# Patient Record
Sex: FEMALE | URBAN - NONMETROPOLITAN AREA
[De-identification: names, ages, dates, MRNs, and addresses within clinical notes are randomized per-mention and may not be internally consistent; named-entity substitution may affect disease eponyms.]

---

## 2022-06-13 ENCOUNTER — OFFICE VISIT (OUTPATIENT)
Dept: URBAN - NONMETROPOLITAN AREA CLINIC 18 | Facility: CLINIC | Age: 82
End: 2022-06-13
Payer: MEDICARE

## 2022-06-13 DIAGNOSIS — Z96.1 PRESENCE OF PSEUDOPHAKIA: ICD-10-CM

## 2022-06-13 DIAGNOSIS — H35.3113 NONEXUDATIVE MACULAR DEGENERATION, ADVANCED ATROPHIC WITHOUT SUBFOVEAL INVOLVEMENT, RIGHT EYE: ICD-10-CM

## 2022-06-13 DIAGNOSIS — H35.3223 EXUDATIVE MACULAR DEGENERATION, INACTIVE SCAR, LEFT EYE: ICD-10-CM

## 2022-06-13 DIAGNOSIS — H43.813 VITREOUS DEGENERATION, BILATERAL: ICD-10-CM

## 2022-06-13 DIAGNOSIS — H52.4 PRESBYOPIA: ICD-10-CM

## 2022-06-13 DIAGNOSIS — E11.9 TYPE 2 DIABETES MELLITUS W/O COMPLICATION: Primary | ICD-10-CM

## 2022-06-13 PROCEDURE — 99204 OFFICE O/P NEW MOD 45 MIN: CPT | Performed by: OPTOMETRIST

## 2022-06-13 ASSESSMENT — VISUAL ACUITY
OD: 20/150
OS: CF 1FT

## 2022-06-13 ASSESSMENT — INTRAOCULAR PRESSURE
OD: 12
OS: 12

## 2022-06-13 NOTE — IMPRESSION/PLAN
Impression: Type 2 diabetes mellitus w/o complication: O98.0. Plan: Findings were discussed in detail. The patient understands that there are no diabetic related changes taking place in their eyes. Patient understands the importance of monitoring blood glucose and blood pressure levels with their primary care physician to reduce the risk of diabetic related vision loss. Diet and exercise are recommended.

## 2022-06-13 NOTE — IMPRESSION/PLAN
Impression: Presbyopia: H52.4. Plan: Reviewed refractive prescription in detail with patient and need for glasses to improve vision at this time. Ed pt improvement limited due to ARMD OS>OD. Discussed lens options and designs. Ok to release spectacle prescription.

## 2022-06-13 NOTE — IMPRESSION/PLAN
Impression: Nonexudative macular degeneration, advanced atrophic without subfoveal involvement, right eye: H35.3113. Plan: Discussed condition in detail with patient. Patient understands that a healthy diet and UV protection with polycarbonate lenses can reduce the progression of dry macular degeneration. Recommended AREDS 2 formula eye vitamins and regular monitoring with home amsler grid. Patient knows to return to clinic immediately if any changes in vision are experienced. Will monitor with fundus photos in 8 months.